# Patient Record
Sex: FEMALE | Race: ASIAN | NOT HISPANIC OR LATINO | URBAN - METROPOLITAN AREA
[De-identification: names, ages, dates, MRNs, and addresses within clinical notes are randomized per-mention and may not be internally consistent; named-entity substitution may affect disease eponyms.]

---

## 2023-01-06 ENCOUNTER — EMERGENCY (EMERGENCY)
Facility: HOSPITAL | Age: 53
LOS: 1 days | Discharge: HOME | End: 2023-01-06
Attending: EMERGENCY MEDICINE | Admitting: EMERGENCY MEDICINE
Payer: MEDICAID

## 2023-01-06 VITALS
HEART RATE: 70 BPM | DIASTOLIC BLOOD PRESSURE: 94 MMHG | RESPIRATION RATE: 16 BRPM | TEMPERATURE: 98 F | SYSTOLIC BLOOD PRESSURE: 150 MMHG | OXYGEN SATURATION: 100 %

## 2023-01-06 VITALS
TEMPERATURE: 98 F | SYSTOLIC BLOOD PRESSURE: 147 MMHG | RESPIRATION RATE: 18 BRPM | OXYGEN SATURATION: 100 % | DIASTOLIC BLOOD PRESSURE: 90 MMHG | HEART RATE: 72 BPM

## 2023-01-06 DIAGNOSIS — Z90.49 ACQUIRED ABSENCE OF OTHER SPECIFIED PARTS OF DIGESTIVE TRACT: Chronic | ICD-10-CM

## 2023-01-06 LAB
ALBUMIN SERPL ELPH-MCNC: 4.2 G/DL — SIGNIFICANT CHANGE UP (ref 3.3–5)
ALP SERPL-CCNC: 115 U/L — SIGNIFICANT CHANGE UP (ref 40–120)
ALT FLD-CCNC: 25 U/L — SIGNIFICANT CHANGE UP (ref 4–33)
ANION GAP SERPL CALC-SCNC: 13 MMOL/L — SIGNIFICANT CHANGE UP (ref 7–14)
APPEARANCE UR: CLEAR — SIGNIFICANT CHANGE UP
AST SERPL-CCNC: 29 U/L — SIGNIFICANT CHANGE UP (ref 4–32)
BASOPHILS # BLD AUTO: 0.02 K/UL — SIGNIFICANT CHANGE UP (ref 0–0.2)
BASOPHILS NFR BLD AUTO: 0.3 % — SIGNIFICANT CHANGE UP (ref 0–2)
BILIRUB SERPL-MCNC: 0.5 MG/DL — SIGNIFICANT CHANGE UP (ref 0.2–1.2)
BILIRUB UR-MCNC: NEGATIVE — SIGNIFICANT CHANGE UP
BUN SERPL-MCNC: 9 MG/DL — SIGNIFICANT CHANGE UP (ref 7–23)
CALCIUM SERPL-MCNC: 9.1 MG/DL — SIGNIFICANT CHANGE UP (ref 8.4–10.5)
CHLORIDE SERPL-SCNC: 101 MMOL/L — SIGNIFICANT CHANGE UP (ref 98–107)
CO2 SERPL-SCNC: 22 MMOL/L — SIGNIFICANT CHANGE UP (ref 22–31)
COLOR SPEC: YELLOW — SIGNIFICANT CHANGE UP
CREAT SERPL-MCNC: 0.36 MG/DL — LOW (ref 0.5–1.3)
DIFF PNL FLD: NEGATIVE — SIGNIFICANT CHANGE UP
EGFR: 121 ML/MIN/1.73M2 — SIGNIFICANT CHANGE UP
EOSINOPHIL # BLD AUTO: 0.13 K/UL — SIGNIFICANT CHANGE UP (ref 0–0.5)
EOSINOPHIL NFR BLD AUTO: 1.8 % — SIGNIFICANT CHANGE UP (ref 0–6)
GLUCOSE SERPL-MCNC: 232 MG/DL — HIGH (ref 70–99)
GLUCOSE UR QL: ABNORMAL
HCT VFR BLD CALC: 39.4 % — SIGNIFICANT CHANGE UP (ref 34.5–45)
HGB BLD-MCNC: 12.6 G/DL — SIGNIFICANT CHANGE UP (ref 11.5–15.5)
IANC: 4.74 K/UL — SIGNIFICANT CHANGE UP (ref 1.8–7.4)
IMM GRANULOCYTES NFR BLD AUTO: 0.3 % — SIGNIFICANT CHANGE UP (ref 0–0.9)
KETONES UR-MCNC: NEGATIVE — SIGNIFICANT CHANGE UP
LEUKOCYTE ESTERASE UR-ACNC: NEGATIVE — SIGNIFICANT CHANGE UP
LYMPHOCYTES # BLD AUTO: 1.81 K/UL — SIGNIFICANT CHANGE UP (ref 1–3.3)
LYMPHOCYTES # BLD AUTO: 25.4 % — SIGNIFICANT CHANGE UP (ref 13–44)
MCHC RBC-ENTMCNC: 28.6 PG — SIGNIFICANT CHANGE UP (ref 27–34)
MCHC RBC-ENTMCNC: 32 GM/DL — SIGNIFICANT CHANGE UP (ref 32–36)
MCV RBC AUTO: 89.5 FL — SIGNIFICANT CHANGE UP (ref 80–100)
MONOCYTES # BLD AUTO: 0.41 K/UL — SIGNIFICANT CHANGE UP (ref 0–0.9)
MONOCYTES NFR BLD AUTO: 5.8 % — SIGNIFICANT CHANGE UP (ref 2–14)
NEUTROPHILS # BLD AUTO: 4.74 K/UL — SIGNIFICANT CHANGE UP (ref 1.8–7.4)
NEUTROPHILS NFR BLD AUTO: 66.4 % — SIGNIFICANT CHANGE UP (ref 43–77)
NITRITE UR-MCNC: NEGATIVE — SIGNIFICANT CHANGE UP
NRBC # BLD: 0 /100 WBCS — SIGNIFICANT CHANGE UP (ref 0–0)
NRBC # FLD: 0 K/UL — SIGNIFICANT CHANGE UP (ref 0–0)
PH UR: 5.5 — SIGNIFICANT CHANGE UP (ref 5–8)
PLATELET # BLD AUTO: 268 K/UL — SIGNIFICANT CHANGE UP (ref 150–400)
POTASSIUM SERPL-MCNC: 4.2 MMOL/L — SIGNIFICANT CHANGE UP (ref 3.5–5.3)
POTASSIUM SERPL-SCNC: 4.2 MMOL/L — SIGNIFICANT CHANGE UP (ref 3.5–5.3)
PROT SERPL-MCNC: 7.6 G/DL — SIGNIFICANT CHANGE UP (ref 6–8.3)
PROT UR-MCNC: NEGATIVE — SIGNIFICANT CHANGE UP
RBC # BLD: 4.4 M/UL — SIGNIFICANT CHANGE UP (ref 3.8–5.2)
RBC # FLD: 13.9 % — SIGNIFICANT CHANGE UP (ref 10.3–14.5)
RBC CASTS # UR COMP ASSIST: SIGNIFICANT CHANGE UP /HPF (ref 0–4)
SODIUM SERPL-SCNC: 136 MMOL/L — SIGNIFICANT CHANGE UP (ref 135–145)
SP GR SPEC: 1.02 — SIGNIFICANT CHANGE UP (ref 1.01–1.05)
UROBILINOGEN FLD QL: SIGNIFICANT CHANGE UP
WBC # BLD: 7.13 K/UL — SIGNIFICANT CHANGE UP (ref 3.8–10.5)
WBC # FLD AUTO: 7.13 K/UL — SIGNIFICANT CHANGE UP (ref 3.8–10.5)
WBC UR QL: SIGNIFICANT CHANGE UP /HPF (ref 0–5)

## 2023-01-06 PROCEDURE — 76830 TRANSVAGINAL US NON-OB: CPT | Mod: 26

## 2023-01-06 PROCEDURE — 99284 EMERGENCY DEPT VISIT MOD MDM: CPT

## 2023-01-06 PROCEDURE — G1004: CPT

## 2023-01-06 PROCEDURE — 74177 CT ABD & PELVIS W/CONTRAST: CPT | Mod: 26,MG

## 2023-01-06 RX ORDER — ACETAMINOPHEN 500 MG
650 TABLET ORAL ONCE
Refills: 0 | Status: COMPLETED | OUTPATIENT
Start: 2023-01-06 | End: 2023-01-06

## 2023-01-06 RX ORDER — PHENAZOPYRIDINE HCL 100 MG
1 TABLET ORAL
Qty: 6 | Refills: 0
Start: 2023-01-06 | End: 2023-01-07

## 2023-01-06 RX ADMIN — Medication 650 MILLIGRAM(S): at 10:17

## 2023-01-06 NOTE — ED ADULT NURSE NOTE - OBJECTIVE STATEMENT
Pt received to wellness awake and alert, A&OX4, ambulatory. Indonesian speaking-  ipad used by provider at bedside. C/o RLQ abd pain x2-3. States pain comes and does. Denies and vaginal bleeding or discharge, or bloody stools. Respirations even and unlabored. Resting comfortably. Denies CP, SOB, N/V, HA, dizziness, palpitations, fatigue. 20G IV placed to  LAC. Bed in lowest position, call bell within reach. NAD

## 2023-01-06 NOTE — ED PROVIDER NOTE - PROGRESS NOTE DETAILS
GABRIELLE Olivo: Workup reviewed - Labs/UA all within normal limits, CT Abdomen/Pelvis without any signs of acute abdominal pathology, will order TVUS for symptoms of lower abdominal pain. Upon reassessment, patient with improvement of symptoms. Pending TVUS. GABRIELLE Olivo: T GABRIELLE Olivo: Workup reviewed - Labs/UA all within normal limits, UA without signs of infection, CT Abdomen/Pelvis without any signs of acute abdominal pathology, will order TVUS for symptoms of lower abdominal pain. Upon reassessment, patient with improvement of symptoms. Pending TVUS. GABRIELLE Olivo: TVUS reviewed - small fundal fibroid noted, no other acute abnormalities such as signs of ovarian cyst/rupture/torsion. Upon reassessment - patient reporting improvement of symptoms but states that dysuria is the most concerning symptom. Patient stable for discharge, hemodynamically stable. Shared Decision Making: Will discharge patient with outpatient OBGYN DR Darwin Lee for persistent symptoms (~3 weeks) of dysuria in the absence of infection on UA and fibroids, will prescribe Pyridium x 2 days for dysuria, urine culture sent. Patient displays understanding and agreeable with plan, strict return precautions discussed. [facility]  [place], ID #[id number] utilized for discharge discussion/instructions.

## 2023-01-06 NOTE — ED PROVIDER NOTE - NSICDXFAMILYHX_GEN_ALL_CORE_FT
FAMILY HISTORY:  Sibling  Still living? Unknown  Family history of kidney stones, Age at diagnosis: Age Unknown

## 2023-01-06 NOTE — ED PROVIDER NOTE - OBJECTIVE STATEMENT
52F with PMH DM, HTN, HLD who presents to ED with right-sided lower abdominal pain x 2-3 weeks. Reporting intermittent right-sided lower abdominal pain with radiation to the back associated with dysuria, urinary frequency/urgency, and nausea. Pt saw PMD initially for symptoms, UA at that time was negative, pt was not treated with antibiotics, states pain has been worsening, went to PMD yesterday who referred her to ED. FHX Kidney Stones in Brother. SH Appendectomy. No recent ill contacts, no recent travel. Received COVID/Flu vaccines. Denies fever, chills, chest pain, shortness of breath, vomiting, diarrhea, extremity weakness/numbness/tingling, lightheadedness, dizziness, or headaches. 52F with PMH DM, HTN, HLD who presents to ED with right-sided lower abdominal pain x 2-3 weeks. Reporting intermittent right-sided lower abdominal pain with radiation to the back associated with dysuria, urinary frequency/urgency, and nausea. Pt saw PMD initially for symptoms, UA at that time was negative, pt was not treated with antibiotics, states pain has been worsening, went to PMD yesterday for symptoms who referred her to ED. No personal history of Kidney stones, FHX Kidney Stones in Brother. SH Appendectomy. No recent ill contacts, no recent travel. Received COVID/Flu vaccines. Denies fever, chills, chest pain, shortness of breath, vomiting, diarrhea, extremity weakness/numbness/tingling, lightheadedness, dizziness, or headaches.  Utilized [facility] , [place] ID# [id number] with PMH DM, HTN, HLD who presents to ED with right-sided lower abdominal pain x 2-3 weeks. Reporting intermittent right-sided lower abdominal pain with radiation to the back associated with dysuria, urinary frequency/urgency, and nausea. Pt saw PMD Gabriella Rodriguez initially for symptoms, UA at that time was negative, pt was not treated with antibiotics, states pain has been worsening, went to PMD yesterday for symptoms who referred her to ED. No personal history of Kidney stones, FHX Kidney Stones in Brother. SH Appendectomy. No recent ill contacts, no recent travel. Received COVID/Flu vaccines. Denies fever, chills, chest pain, shortness of breath, vomiting, diarrhea, extremity weakness/numbness/tingling, lightheadedness, dizziness, or headaches.  Utilized [facility] [place] ID# [id number] with PMH DM, HTN, HLD who presents to ED with right-sided lower abdominal pain x 2-3 weeks. Reporting intermittent right-sided lower abdominal pain with radiation to the back associated with dysuria, urinary frequency/urgency, and nausea. Pt saw PMD Gabriella Rodriguez initially for symptoms, UA at that time was negative, pt was not treated with antibiotics, states pain has been worsening, went to PMD yesterday for symptoms who referred her to ED. No personal history of Kidney stones, FHX Kidney Stones in Brother. SH Appendectomy. LMP 2 years ago. No recent ill contacts, no recent travel. Received COVID/Flu vaccines. Denies fever, chills, chest pain, shortness of breath, vomiting, diarrhea, extremity weakness/numbness/tingling, lightheadedness, dizziness, or headaches.  Utilized [facility] , [place] ID# [id number]

## 2023-01-06 NOTE — ED PROVIDER NOTE - MUSCULOSKELETAL, MLM
Spine appears normal, range of motion is not limited, no muscle or joint tenderness, no midline spinal tenderness.

## 2023-01-06 NOTE — ED PROVIDER NOTE - CLINICAL SUMMARY MEDICAL DECISION MAKING FREE TEXT BOX
52F with PMH DM, HTN, HLD who presents to ED with right-sided lower abdominal pain x 2-3 weeks associated with dysuria, urinary frequency/urgency, and nausea. Patient currently afebrile, hemodynamically stable, spO2 100%. Based on history and physical, differentials include but are not limited to UTI, Pyelonephritis, Nephrolithiasis. Plan to assess patient for acute pathology as listed above with Labs, UA, CT Abdomen/Pelvis. Will administer medications for symptomatic relief, follow-up on results, and reassess.

## 2023-01-06 NOTE — ED PROVIDER NOTE - MUSCULOSKELETAL NEGATIVE STATEMENT, MLM
no back pain, no musculoskeletal pain, no neck pain, and no weakness. no back pain, no musculoskeletal pain, no neck pain, no weakness.

## 2023-01-06 NOTE — ED PROVIDER NOTE - NSFOLLOWUPINSTRUCTIONS_ED_ALL_ED_FT
Advance activity as tolerated.  Continue all previously prescribed medications as directed unless otherwise instructed.  Follow up with your primary care physician in 48-72 hours- bring copies of your results.  Return to the ER for worsening or persistent symptoms, and/or ANY NEW OR CONCERNING SYMPTOMS. If you have issues obtaining follow up, please call: [phone] to obtain a doctor or specialist who takes your insurance in your area.  You may call [phone] to make an appointment with the internal medicine clinic. Pyridium 200 mg, three times a day, for 2 days for pain with urination  Follow up with your Gynecologist as discussed within the next 2-3 days.    Advance activity as tolerated.  Continue all previously prescribed medications as directed unless otherwise instructed.  Follow up with your primary care physician in 48-72 hours- bring copies of your results.  Return to the ER for worsening or persistent symptoms, and/or ANY NEW OR CONCERNING SYMPTOMS high fever, blood in urine, inability to control urination or bowel movements. If you have issues obtaining follow up, please call: [phone] to obtain a doctor or specialist who takes your insurance in your area.  You may call [phone] to make an appointment with the internal medicine clinic.      Dysuria is pain or discomfort during urination. The pain or discomfort may be felt in the part of the body that drains urine from the bladder (urethra) or in the surrounding tissue of the genitals. The pain may also be felt in the groin area, lower abdomen, or lower back.    You may have to urinate frequently or have the sudden feeling that you have to urinate (urgency). Dysuria can affect anyone, but it is more common in females. Dysuria can be caused by many different things, including:  •Urinary tract infection.      •Kidney stones or bladder stones.      •Certain STIs (sexually transmitted infections), such as chlamydia.      •Dehydration.      •Inflammation of the tissues of the vagina.      •Use of certain medicines.      •Use of certain soaps or scented products that cause irritation.        Follow these instructions at home:    Medicines     •Take over-the-counter and prescription medicines only as told by your health care provider.      •If you were prescribed an antibiotic medicine, take it as told by your health care provider. Do not stop taking the antibiotic even if you start to feel better.        Eating and drinking      •Drink enough fluid to keep your urine pale yellow.      •Avoid caffeinated beverages, tea, and alcohol. These beverages can irritate the bladder and make dysuria worse. In males, alcohol may irritate the prostate.      General instructions     •Watch your condition for any changes.      •Urinate often. Avoid holding urine for long periods of time.      •If you are female, you should wipe from front to back after urinating or having a bowel movement. Use each piece of toilet paper only once.      •Empty your bladder after sex.      •Keep all follow-up visits. This is important.      •If you had any tests done to find the cause of dysuria, it is up to you to get your test results. Ask your health care provider, or the department that is doing the test, when your results will be ready.        Contact a health care provider if:    •You have a fever.      •You develop pain in your back or sides.      •You have nausea or vomiting.      •You have blood in your urine.      •You are not urinating as often as you usually do.        Get help right away if:    •Your pain is severe and not relieved with medicines.      •You cannot eat or drink without vomiting.      •You are confused.      •You have a rapid heartbeat while resting.      •You have shaking or chills.      •You feel extremely weak.        Summary    •Dysuria is pain or discomfort while urinating. Many different conditions can lead to dysuria.      •If you have dysuria, you may have to urinate frequently or have the sudden feeling that you have to urinate (urgency).      •Watch your condition for any changes. Keep all follow-up visits.      •Make sure that you urinate often and drink enough fluid to keep your urine pale yellow.      This information is not intended to replace advice given to you by your health care provider. Make sure you discuss any questions you have with your health care provider.

## 2023-01-06 NOTE — ED ADULT NURSE NOTE - CHIEF COMPLAINT QUOTE
Sami speaking-  lower abd pains since yesterday, radiating to back. reports burning upon urination  fs [phone]

## 2023-01-06 NOTE — ED PROVIDER NOTE - PATIENT PORTAL LINK FT
You can access the FollowMyHealth Patient Portal offered by [facility] by registering at the following website: [email or web address]. By joining [employer]’s FollowMyHealth portal, you will also be able to view your health information using other applications (apps) compatible with our system.

## 2023-01-07 LAB
CULTURE RESULTS: SIGNIFICANT CHANGE UP
SPECIMEN SOURCE: SIGNIFICANT CHANGE UP

## 2025-02-26 NOTE — ED PROVIDER NOTE - INTERPRETER'S NAME
Health Maintenance       Shingles Vaccine (1 of 2)  Never done    Respiratory Syncytial Virus (RSV) Vaccine 60+ (1 - 1-dose 75+ series)  Never done    DTaP/Tdap/Td Vaccine (2 - Td or Tdap)  Overdue since 11/11/2024    Medicare Advantage- Medicare Wellness Visit (Yearly - January to December)  Due since 1/1/2025           Following review of the above:  Patient is not proceeding with: Dtap/Tdap/Td, Respiratory Syncytial Virus (RSV), and Shingles    Note: Refer to final orders and clinician documentation.         Sipra